# Patient Record
(demographics unavailable — no encounter records)

---

## 2022-03-04 NOTE — ED GU-FEMALE
General


Chief Complaint:  OB < 20 WEEKS


Stated Complaint:  VAG BLEEDING 6 WKS PREG


Nursing Triage Note:  


pt reports being 6-7 weeks pregnant with vaginal bleeding starting approx. 30min




pta.


Source:  patient


Exam Limitations:  no limitations





History of Present Illness


Date Seen by Provider:  Mar 4, 2022


Time Seen by Provider:  00:05


Initial Comments


Patient is a 42-year-old female G4, P3 who presents to the emergency department 

approximately 6 weeks pregnant with light vaginal bleeding/spotting.  Patient 

states symptom onset this evening.  She saw a clinic last week and had a 

transvaginal ultrasound done which reportedly showed an intrauterine gestational

sac and she states another area of "darkness" adjacent to the sac.  She has a 

follow-up appointment scheduled with Dr. Mathis on  and another 

transvaginal ultrasound scheduled for .  Patient is concerned she might 

be having a miscarriage.  She denies any significant pelvic cramping.  She has a

burning discomfort in her lower abdomen.  No burning with urination, increased 

frequency or urgency.  No abnormal vaginal discharge other than the bleeding.  

She has had no prior issues with bleeding in previous pregnancies.  No reported 

trauma.  No recent fevers or chills.  No diarrhea, black or bloody stools.





All other review of systems reviewed and negative except as stated.


Timing/Duration:  this evening


Severity/Quality:  mild


Location:  suprapubic


Radiation:  none


Activities at Onset:  none


Prior Genitourinary Problems:  none


Sexual [place] History:  less than 2 months ago, single partner


Modifying Factors:  Improves With Other ("burning pain" lower abdomen)





Allergies and Home Medications


Allergies


Coded Allergies:  


     No Known Drug Allergies (Unverified , 18)





Patient Home Medication List


Home Medication List Reviewed:  Yes


Exenatide Microspheres (Bydureon Bcise) 2 Mg/0.85 Ml Auto.injct, (Reported)


   Entered as Reported by: VALE AIKEN on 3/3/22 5060


   Last Action: New Order


Metformin HCl (Metformin HCl) 500 Mg Tablet, 1,000 PO BID, (Reported)


   Entered as Reported by: NAZANIN HARRIS on 18 1608


[id number]/Iron Fumarate/FA/Dss (Prenatal 19 Tablet) 1 Each Tablet, 1 EACH PO DAILY,

(Reported)


   Entered as Reported by: ELLA BROTHERS on 18 2300


Sertraline HCl (Sertraline HCl) 100 Mg Tablet, (Reported)


   Entered as Reported by: VALE AIKEN on 3/3/22 4553


   Last Action: New Order


Discontinued Medications


Docusate Sodium (Docusate Sodium) 100 Mg Capsule, 100 MG PO BID PRN for 

CONSTIPATION-1ST LINE


   Discontinued Reason: No Longer Taking


   Prescribed by: MIRTHA ROMERO on 18


   Last Action: Discontinued


Hydrocodone Bit/Acetaminophen (Lortab  5 Mg Tablet) 1 Tab Tab, 1-2 TAB PO Q4H 

PRN for PAIN-MODERATE


   Discontinued Reason: No Longer Taking


   Prescribed by: MIRTHA ROMERO on 18


   Last Action: Discontinued


Ibuprofen (Ibu) 600 Mg Tablet, 600 MG PO Q6H


   Discontinued Reason: No Longer Taking


   Prescribed by: MIRTHA ROMERO on 18


   Last Action: Discontinued


Nitrofurantoin Monohyd/M-Cryst (Macrobid 100 mg Capsule) 100 Mg Capsule, 1 TAB 

PO BID


   Discontinued Reason: No Longer Taking


   Prescribed by: MARIVEL STEWART on 21 0210


   Last Action: Discontinued


Ondansetron (Ondansetron Odt) 8 Mg Tab.rapdis, 8 MG PO Q4H PRN for 

NAUSEA/VOMITING


   Discontinued Reason: No Longer Taking


   Prescribed by: MARIVEL STEWART on 21 0156


   Last Action: Discontinued





Review of Systems


Review of Systems


Constitutional:  see HPI


EENTM:  no symptoms reported


Respiratory:  no symptoms reported


Cardiovascular:  no symptoms reported


Gastrointestinal:  no symptoms reported


Genitourinary:  other (vaginal bleeding)


Expected Date of Delivery:  Oct 23, 2022


LMP:  2022


Musculoskeletal:  no symptoms reported


Skin:  no symptoms reported


Psychiatric/Neurological:  No Symptoms Reported





Past Medical-Social-Family Hx


Patient Social History


Tobacco Use?:  No


Substance use?:  No


Alcohol Use?:  No


Pt feels they are or have been:  No





Immunizations Up To Date


Tetanus Booster (TDap):  Unknown





Seasonal Allergies


Seasonal Allergies:  No





Past Medical History


Surgery/Hospitalization HX:  


c-sect x3, cholecystectomy, appendectomy, lipoma removal, niddm


Surgeries:  Yes (LIPOMA FROM ABDX2,  2 )


Appendectomy,  Section


Respiratory:  No


Cardiac:  No


Neurological:  No


Expected Date of Delivery:  Oct 23, 2022


Reproductive Disorders:  Yes


Female Reproductive Disorders:  Menstrual Problems


Sexually Transmitted Disease:  No


HIV/AIDS:  No


Genitourinary:  No


Gastrointestinal:  Yes


Gastroesophageal Reflux


Musculoskeletal:  No


Endocrine:  Yes (GESTATIONAL DIABETES)


Diabetes, Non-Insulin dep


HEENT:  No


Loss of Vision:  Denies


Hearing Impairment:  Denies


Cancer:  No


Psychosocial:  Yes (HX OF ANXIETY)


Anxiety, Depression


Integumentary:  No


Blood Disorders:  No


Adverse Reaction/Blood Tranf:  No (N/A)





Physical Exam


Vital Signs





Vital Signs - First Documented








 3/3/22





 23:50


 


Temp 36.0


 


Pulse 85


 


Resp 18


 


B/P (MAP) 118/70 (86)


 


Pulse Ox 98


 


O2 Delivery Room Air





Capillary Refill : Less Than 3 Seconds


Height, Weight, BMI


Height: 5'6.00"


Weight: 228lbs. 0.0oz. 103.275136jx; 34.00 BMI


Method:Stated


General Appearance:  WD/WN, no apparent distress


Neck:  normal inspection


Cardiovascular:  regular rate, rhythm


Respiratory:  lungs clear, normal breath sounds, no respiratory distress, no 

accessory muscle use


Gastrointestinal:  normal bowel sounds, soft, tenderness (mild suprapubic tende

rness to palpation)


Extremities:  normal range of motion, non-tender, normal inspection, no pedal 

edema


Neurologic/Psychiatric:  alert, normal mood/affect, oriented x 3


Skin:  normal color, warm/dry





Progress/Results/Core Measures


Suspected Sepsis


SIRS


Temperature: 


Pulse: 85 


Respiratory Rate: 18


 


Laboratory Tests


3/4/22 00:16: White Blood Count 13.0H


Blood Pressure 118 /70 


Mean: 86


 





Laboratory Tests


3/4/22 00:16: Platelet Count 345








Results/Orders


Lab Results





Laboratory Tests








Test


 3/3/22


23:57 3/4/22


00:16 Range/Units


 


 


Urine Color YELLOW    


 


Urine Clarity CLEAR    


 


Urine pH 6.0   5-9  


 


Urine Specific Gravity 1.025 H  1.016-1.022  


 


Urine Protein NEGATIVE   NEGATIVE  


 


Urine Glucose (UA) NEGATIVE   NEGATIVE  


 


Urine Ketones TRACE H  NEGATIVE  


 


Urine Nitrite NEGATIVE   NEGATIVE  


 


Urine Bilirubin NEGATIVE   NEGATIVE  


 


Urine Urobilinogen 0.2   < = 1.0  MG/DL


 


Urine Leukocyte Esterase NEGATIVE   NEGATIVE  


 


Urine RBC (Auto) 3+ H  NEGATIVE  


 


Urine RBC 2-5 H   /HPF


 


Urine WBC NONE    /HPF


 


Urine Squamous Epithelial


Cells 2-5 


 


  /HPF





 


Urine Crystals NONE    /LPF


 


Urine Bacteria TRACE    /HPF


 


Urine Casts NONE    /LPF


 


Urine Mucus NEGATIVE    /LPF


 


Urine Culture Indicated NO    


 


White Blood Count


 


 13.0 H


 4.3-11.0


10^3/uL


 


Red Blood Count


 


 4.53 


 3.80-5.11


10^6/uL


 


Hemoglobin  12.9  11.5-16.0  g/dL


 


Hematocrit  39  35-52  %


 


Mean Corpuscular Volume  86  80-99  fL


 


Mean Corpuscular Hemoglobin  29  25-34  pg


 


Mean Corpuscular Hemoglobin


Concent 


 33 


 32-36  g/dL





 


Red Cell Distribution Width  12.3  10.0-14.5  %


 


Platelet Count


 


 345 


 130-400


10^3/uL


 


Mean Platelet Volume  10.3  9.0-12.2  fL


 


Immature Granulocyte % (Auto)  0   %


 


Neutrophils (%) (Auto)  61  42-75  %


 


Lymphocytes (%) (Auto)  31  12-44  %


 


Monocytes (%) (Auto)  7  0-12  %


 


Eosinophils (%) (Auto)  1  0-10  %


 


Basophils (%) (Auto)  1  0-10  %


 


Neutrophils # (Auto)


 


 7.9 H


 1.8-7.8


10^3/uL


 


Lymphocytes # (Auto)


 


 4.0 


 1.0-4.0


10^3/uL


 


Monocytes # (Auto)


 


 0.9 


 0.0-1.0


10^3/uL


 


Eosinophils # (Auto)


 


 0.2 


 0.0-0.3


10^3/uL


 


Basophils # (Auto)


 


 0.1 


 0.0-0.1


10^3/uL


 


Immature Granulocyte # (Auto)


 


 0.0 


 0.0-0.1


10^3/uL


 


Human Chorionic Gonadotropin,


Quant 


 1822 H


 <5  MIU/ML











My Orders





Orders - ELA ARREDONDO MD


Ed Iv/Invasive Line Start (3/4/22 00:12)


Cbc With Automated Diff (3/4/22 00:12)


Hcg,Quantitative (3/4/22 00:12)


Ua Culture If Indicated (3/4/22 00:12)


Abo Rh Type (3/4/22 00:12)





Vital Signs/I&O


Capillary Refill : Less Than 3 Seconds








Blood Pressure Mean:                    86








Progress Note :  


   Time:  01:31


Progress Note


Bedside ultrasound used to evaluate for IUP with heartbeat.  I was able to 

perform transabdominal bedside ED ultrasound.  I was able to visualize a 

gestational sac and I thought I saw a hint of a yolk sac.  I was unable to 

identify heart tones.  Patient states that she is actually bleeding heavier now 

than when she first arrived.  Her quantitative hCG is 1800.  She will follow up 

with Dr. Mathis tomorrow and has a transvaginal ultrasound scheduled for Monday. 

I reassured her.  I advised her to drink plenty of fluids, she can take Tylenol 

for cramping.  Pelvic rest recommended.  No use of tampons.  Return precautions 

discussed.  I am also sending her home with an outpatient order to recheck her 

quantitative hCG on Saturday.  All questions are sought and answered.  Patient 

is stable for discharge.





Departure


Impression





   Primary Impression:  


   Threatened miscarriage in early pregnancy


Disposition:   HOME, SELF-CARE


Condition:  Stable





Departure-Patient Inst.


Decision time for Depature:  01:32


Referrals:  


[facility] [place] (PCP/Family)


Primary Care Physician








TENA MATHIS MD


Patient Instructions:  Threatened Miscarriage (DC)





Add. Discharge Instructions:  


Drink plenty of fluids to stay well-hydrated.





Over-the-counter extra strength Tylenol, 2 tablets every 6 hours as needed for 

pain/cramping.





If you have significantly heavy bleeding, feeling lightheaded or dizzy with 

standing up, especially if you have a passing out spell, you need to come back 

to the emergency department for reevaluation.





Only use pads for vaginal bleeding.  No tampons.  Pelvic rest until you follow-

up with Dr. Mathis.





Come back to the hospital on Saturday for a redraw of your pregnancy hormone 

level.  Your level today was 1822. An order has been provided for this test.





Copy


Copies To 1:   TENA MATHIS MD, KATHRYN M MD          Mar 4, 2022 00:13

## 2022-09-29 NOTE — DIAGNOSTIC IMAGING REPORT
INDICATION: Headache, x3 weeks, right-sided.



TECHNIQUE: Multiple contiguous axial images were obtained through

the brain without the use of intravenous contrast. Auto Exposure

Controls were utilized during the CT exam to meet ALARA standards

for radiation dose reduction. 



Comparison made with 02/13/2014.



There were no extra-axial fluid collections. No intracranial

hemorrhage. No intracranial mass or mass effect. No midline

shift. The ventricles are normal in size and position. There were

no focal parenchymal abnormalities in the brain. Calvarial

windows are unremarkable.



IMPRESSION:



Negative noncontrast brain CT.



Dictated by: 



  Dictated on workstation # [id number]

## 2022-09-29 NOTE — ED HEAD INJURY
General


Chief Complaint:  Head/Cervical Problems


Stated Complaint:  HEADACHE,FEELS LIKE HEAD IS SWOLLEN





History of Present Illness


Date Seen by Provider:  Sep 29, 2022


Time Seen by Provider:  16:18


Initial Comments


Patient reports that she has had a headache every day for the past three weeks. 

Has been taking Tylenol/Ibuprofen at home for her symptoms. It helps take the 

edge off but states that the headache never completely go away. Last saw an eye 

doctor in the past 1-2 years. Denies numbness,tingling, weakness, or slurred 

speech. Reports that she has noticed occasional blurriness from right eye. 

Denies nausea, vomiting, diarrhea. Denies exposure to COVID that she is aware 

of. Initially thought that maybe her blood sugar was too high. She has not been 

checking her blood sugars at home. Was seen at [facility] this morning and has labs 

ordered tomorrow AM since she was not fasting today. They gave her a machine to 

check her blood sugars and report that she is running 140-160s today.


Occurred:  other (3 weeks)


Severity:  moderate


Location:  frontal


Associated Systoms:  No Chest Pain, No Cough, No Diaphoresis, No Fever/Chills; 

Headaches; No Malaise, No Nausea/Vomiting, No Syncope, No Weakness





Allergies and Home Medications


Allergies


Coded Allergies:  


     No Known Drug Allergies (Unverified , 18)





Patient Home Medication List


Home Medication List Reviewed:  Yes


Exenatide Microspheres (Bydureon Bcise) 2 Mg/0.85 Ml Auto.injct, (Reported)


   Entered as Reported by: VALE AIKEN on 3/3/22 2355


Metformin HCl (Metformin HCl) 500 Mg Tablet, 1,000 PO BID, (Reported)


   Entered as Reported by: NAZANIN HARRIS on 18 1608


Pph168/Iron Fumarate/FA/Dss (Prenatal 19 Tablet) 1 Each Tablet, 1 EACH PO DAILY,

(Reported)


   Entered as Reported by: ELLA BROTHERS on 18 230


Sertraline HCl (Sertraline HCl) 100 Mg Tablet, (Reported)


   Entered as Reported by: VALE AIKEN on 3/3/22 2355





Review of Systems


Review of Systems


Constitutional:  No dizziness, No fever, No weakness


Eyes:  Denies Blindness; Blurred Vision (right eye at times); Denies Drainage, 

Denies Pain, Denies Photophobia


Ears, Nose, Mouth, Throat:  no symptoms reported


Respiratory:  No cough, No short of breath


Cardiovascular:  No chest pain, No palpitations


Gastrointestinal:  No abdominal pain, No nausea, No vomiting


Musculoskeletal:  no symptoms reported


Skin:  No pruritus, No rash


Psychiatric/Neurological:  Headache; Denies Weakness





All Other Systems Reviewed


Negative Unless Noted:  Yes





Past Medical-Social-Family Hx


Immunizations Up To Date


Tetanus Booster (TDap):  Unknown





Seasonal Allergies


Seasonal Allergies:  No





Past Medical History


Surgery/Hospitalization HX:  


c-sect x3, cholecystectomy, appendectomy, lipoma removal, niddm


Surgeries:  Yes (LIPOMA FROM ABDX2,  2 )


Appendectomy,  Section


Respiratory:  No


Cardiac:  No


Neurological:  No


Reproductive Disorders:  Yes


Female Reproductive Disorders:  Menstrual Problems


Sexually Transmitted Disease:  No


HIV/AIDS:  No


Genitourinary:  No


Gastrointestinal:  Yes


Gastroesophageal Reflux


Musculoskeletal:  No


Endocrine:  Yes (GESTATIONAL DIABETES)


Diabetes, Non-Insulin dep


HEENT:  No


Loss of Vision:  Denies


Hearing Impairment:  Denies


Cancer:  No


Psychosocial:  Yes (HX OF ANXIETY)


Anxiety, Depression


Integumentary:  No


Blood Disorders:  No


Adverse Reaction/Blood Tranf:  No (N/A)





Family Medical History


Reviewed Nursing Family Hx





Physical Exam


Vital Signs





Vital Signs - First Documented








 22





 16:00


 


Temp 36.0


 


Pulse 62


 


Resp 18


 


B/P (MAP) 146/93 (110)


 


Pulse Ox 97





Capillary Refill :


Height, Weight, BMI


Height: 5'6.00"


Weight: 228lbs. 0.0oz. 103.358315ch; 34.00 BMI


Method:Stated


General Appearance:  WD/WN, no apparent distress


HEENT:  PERRL/EOMI, normal ENT inspection, TMs normal, pharynx normal


Neck:  non-tender, full range of motion, supple, normal inspection, other (no 

meningeal signs appreciated on exam)


Cardiovascular:  regular rate, rhythm


Respiratory:  lungs clear, normal breath sounds, no respiratory distress, no 

accessory muscle use


Gastrointestinal:  normal bowel sounds, non tender, soft


Psychiatric:  alert, oriented x 3





[place] Coma Score


Best Eye Response:  (4) Open Spontaneously


Best Verbal Response:  (5) Oriented


Best Motor Response:  (6) Obeys Commands





Progress/Results/Core Measures


Results/Orders


Lab Results





Laboratory Tests








Test


 22


16:45 Range/Units


 


 


White Blood Count


 12.1 H


 4.3-11.0


10^3/uL


 


Red Blood Count


 4.83 


 3.80-5.11


10^6/uL


 


Hemoglobin 13.7  11.5-16.0  g/dL


 


Hematocrit 42  35-52  %


 


Mean Corpuscular Volume 86  80-99  fL


 


Mean Corpuscular Hemoglobin 28  25-34  pg


 


Mean Corpuscular Hemoglobin


Concent 33 


 32-36  g/dL





 


Red Cell Distribution Width 12.6  10.0-14.5  %


 


Platelet Count


 382 


 130-400


10^3/uL


 


Mean Platelet Volume 10.1  9.0-12.2  fL


 


Immature Granulocyte % (Auto) 0   %


 


Neutrophils (%) (Auto) 61  42-75  %


 


Lymphocytes (%) (Auto) 30  12-44  %


 


Monocytes (%) (Auto) 7  0-12  %


 


Eosinophils (%) (Auto) 1  0-10  %


 


Basophils (%) (Auto) 0  0-10  %


 


Neutrophils # (Auto)


 7.4 


 1.8-7.8


10^3/uL


 


Lymphocytes # (Auto)


 3.6 


 1.0-4.0


10^3/uL


 


Monocytes # (Auto)


 0.9 


 0.0-1.0


10^3/uL


 


Eosinophils # (Auto)


 0.1 


 0.0-0.3


10^3/uL


 


Basophils # (Auto)


 0.1 


 0.0-0.1


10^3/uL


 


Immature Granulocyte # (Auto)


 0.0 


 0.0-0.1


10^3/uL


 


Sodium Level 138  135-145  MMOL/L


 


Potassium Level 4.1  3.6-5.0  MMOL/L


 


Chloride Level 103    MMOL/L


 


Carbon Dioxide Level 21  21-32  MMOL/L


 


Anion Gap 14  5-14  MMOL/L


 


Blood Urea Nitrogen 12  7-18  MG/DL


 


Creatinine


 0.79 


 0.60-1.30


MG/DL


 


Estimat Glomerular Filtration


Rate 95 


  





 


BUN/Creatinine Ratio 15   


 


Glucose Level 122 H   MG/DL


 


Calcium Level 9.9  8.5-10.1  MG/DL


 


Corrected Calcium 9.5  8.5-10.1  MG/DL


 


Total Bilirubin 0.4  0.1-1.0  MG/DL


 


Aspartate Amino Transf


(AST/SGOT) 32 


 5-34  U/L





 


Alanine Aminotransferase


(ALT/SGPT) 36 


 0-55  U/L





 


Alkaline Phosphatase 49    U/L


 


C-Reactive Protein High


Sensitivity 1.30 H


 0.00-0.50


MG/DL


 


Total Protein 8.1  6.4-8.2  GM/DL


 


Albumin 4.5  3.2-4.5  GM/DL








My Orders





Orders - HUNTER GEORGES


Ct Head Wo (22 16:26)


Cbc With Automated Diff (22 16:26)


Comprehensive Metabolic Panel (22 16:26)


Hs C Reactive Protein (22 16:26)


Ketorolac Injection (Toradol Injection) (22 17:45)





Medications Given in ED





Current Medications








 Medications  Dose


 Ordered  Sig/Ashley


 Route  Start Time


 Stop Time Status Last Admin


Dose Admin


 


 Ketorolac


 Tromethamine  60 mg  ONCE  ONCE


 IM  22 17:45


 22 17:46 DC 22 17:46


60 MG








Vital Signs/I&O











 22





 16:00 17:47


 


Temp 36.0 36.0


 


Pulse 62 62


 


Resp 18 18


 


B/P (MAP) 146/93 (110) 146/93


 


Pulse Ox 97 97











Progress


Progress Note :  


Progress Note


Patient presents to the ER for persistent headache for the past three weeks. Has

been taking over the counter medications at home with mild improvement of 

symptoms. No focal neuro deficits appreciated on exam. Will order labs and CT 

scan. If scans look okay will give IM Toradol and Benadryl.





1735: Reviewed labs and CT scan with patient. Of note, her WBC and CRP were 

slightly elevated. No meningeal signs. Is afebrile. CT head was negative. These 

findings were reassurance to the patient. Has labs scheduled in the morning and 

is going to have them done. Instructed that she can continue Tylenol and 

Ibuprofen for pain. May also consider taking  Benadryl every 4-6 hours as needed

for headache. I did offer to give Benadryl IM while she was here and she 

declined. She states that she would rather take that medication at home. Reasons

to return to the ER were discussed with patient.





Diagnostic Imaging





   Diagonstic Imaging:  CT


   Plain Films/CT/US/NM/MRI:  head


Comments


NAME:   CAROLE LORD


MED REC#:   [id number]


ACCOUNT#:   [id number]


PT STATUS:   REG ER


:   1979


PHYSICIAN:   HUNTER GEORGES


ADMIT DATE:   22/ER


                                  ***Signed***


Date of Exam:22





CT HEAD WO








INDICATION: Headache, x3 weeks, right-sided.





TECHNIQUE: Multiple contiguous axial images were obtained through


the brain without the use of intravenous contrast. Auto Exposure


Controls were utilized during the CT exam to meet ALARA standards


for radiation dose reduction. 





Comparison made with 2014.





There were no extra-axial fluid collections. No intracranial


hemorrhage. No intracranial mass or mass effect. No midline


shift. The ventricles are normal in size and position. There were


no focal parenchymal abnormalities in the brain. Calvarial


windows are unremarkable.





IMPRESSION:





Negative noncontrast brain CT.





Dictated by: 





  Dictated on workstation # [id number]








Dict:   22 1707


Trans:   22 1745


[facility] [id number]





Interpreted by:     KLAUDIA GARCIA MD


Electronically signed by: KLAUDIA GARCIA MD 22 1745





Departure


Impression





   Primary Impression:  


   Migraine


   Qualified Codes:  G43.919 - Migraine, unspecified, intractable, without 

   status migrainosus


Disposition:   HOME, SELF-CARE


Condition:  Stable





Departure-Patient Inst.


Decision time for Depature:  17:39


Referrals:  


[facility] [place] (PCP/Family)


Primary Care Physician


Patient Instructions:  Migraines (DC)





Add. Discharge Instructions:  


1. Home and rest.


2. Push fluids.


3. Alternate Tylenol/Ibuprofen as needed for pain.


4. Follow up with PCP as needed.


5. Consider taking Benadryl every 4-6 hours as needed for headache.


6. Alternate Tylenol/Ibuprofen as needed for pain.


7. Return here if worse or concerns.





All discharge instructions reviewed with patient and/or family. Voiced 

understanding.











HUNTER GEORGES       Sep 29, 2022 16:19

## 2023-07-26 NOTE — DIAGNOSTIC IMAGING REPORT
INDICATION: Chest wall pain, cough



The lungs clear. No failure, effusion or pneumothorax.



IMPRESSION:

No acute appearing abnormality.



Dictated by: 



  Dictated on workstation # [id number]

## 2023-07-26 NOTE — ED GENERAL
General


Chief Complaint:  Chest Wall


Stated Complaint:  LUMP ON STERNUM


Nursing Triage Note:  


c/o back pain, lump on chest x1 week. generalized maliase


Source of Information:  Patient


Exam Limitations:  No Limitations





History of Present Illness


Date Seen by Provider:  2023


Time Seen by Provider:  01:06


Initial Comments


Here with report of lump on her chest between her breasts that she is concerned 

about and states that it is causing her pain.  She states that is causing her 

anxiety to get up as well.  Worse when she is laying down and better when 

sitting up.  She also has some difficulty swallowing.  She is following her with

her doctor who has her set up for mammogram and breast ultrasound due to the 

lump.  She has had ultrasound of the thyroid as well and has a very small nodule

and will have continuing follow-up for that.  She notes that she has had some 

upper respiratory symptoms and itchy throat and mild cough recently with some 

chills and no fever.  She is just very concerned about this lump and wants to 

make sure that there is not a mass in her chest otherwise.


Timing/Duration:  1 Week


Severity:  Moderate


Associated Systoms:  Cough; No Nausea/Vomiting, No Shortness of Air, No Weakness





Allergies and Home Medications


Allergies


Coded Allergies:  


     No Known Drug Allergies (Unverified , 18)





Patient Home Medication List


Home Medication List Reviewed:  Yes


Celecoxib (Celecoxib) 200 Mg Capsule, (Reported)


   Entered as Reported by: VALE AIKEN on 23


   Last Action: New Order


Dextroamphetamine/Amphetamine (Amphetamine Salts 20 mg Tablet) 20 Mg Tablet, 

(Reported)


   Entered as Reported by: VALE AIKEN on 23


   Last Action: New Order


Dextroamphetamine/Amphetamine (Amphetamine Salts 30 mg Tablet) 30 Mg Tablet, 

(Reported)


   Entered as Reported by: VALE AIKEN on 23


   Last Action: New Order


Metformin HCl (Metformin HCl) 500 Mg Tablet, 1,000 PO BID, (Reported)


   Entered as Reported by: NAZANIN HARRIS on 18 1608


Methocarbamol (Methocarbamol) 750 Mg Tablet, (Reported)


   Entered as Reported by: VALE AIKEN on 23


   Last Action: New Order


Discontinued Medications


Exenatide Microspheres (Bydureon Bcise) 2 Mg/0.85 Ml Auto.injct, (Reported)


   Discontinued Reason: No Longer Taking


   Entered as Reported by: VALE AIKEN on 3/3/22 2355


   Last Action: Discontinued


Gxy197/Iron Fumarate/FA/Dss (Prenatal 19 Tablet) 1 Each Tablet, 1 EACH PO DAILY,

(Reported)


   Discontinued Reason: No Longer Taking


   Entered as Reported by: ELLA BROTHERS on 18 2300


   Last Action: Discontinued


Sertraline HCl (Sertraline HCl) 100 Mg Tablet, (Reported)


   Discontinued Reason: No Longer Taking


   Entered as Reported by: VALE AIKEN on 3/3/22 2355


   Last Action: Discontinued





Review of Systems


Review of Systems


Constitutional:  see HPI; No chills, No fever


EENTM:  nose congestion, throat pain


Respiratory:  cough; No short of breath


Cardiovascular:  chest pain (Anterior chest wall between breasts near lump 

noted)


Gastrointestinal:  No nausea, No vomiting


Genitourinary:  no symptoms reported


Musculoskeletal:  back pain (Chronic)


Skin:  No change in color, No lesions





Past Medical-Social-Family Hx


Patient Social History


Tobacco Use?:  No


Substance use?:  Yes


Substance type:  Marijuana


Alcohol Use?:  No


Pt feels they are or have been:  No





Immunizations Up To Date


Tetanus Booster (TDap):  Unknown


First/Initial COVID19 Vaccinat:  na





Seasonal Allergies


Seasonal Allergies:  No





Past Medical History


Surgery/Hospitalization HX:  


c-sect x3, cholecystectomy, appendectomy, lipoma removal, niddm, gerd, anxiety, 


depression


Surgeries:  Yes (LIPOMA FROM ABDX2,  2 )


Appendectomy,  Section


Respiratory:  No


Cardiac:  No


Neurological:  No


Last Menstrual Period:  2023


Reproductive Disorders:  Yes


Female Reproductive Disorders:  Menstrual Problems


Sexually Transmitted Disease:  No


HIV/AIDS:  No


Genitourinary:  No


Gastrointestinal:  Yes


Gastroesophageal Reflux


Musculoskeletal:  No


Endocrine:  Yes (GESTATIONAL DIABETES)


Diabetes, Non-Insulin dep


HEENT:  No


Loss of Vision:  Denies


Hearing Impairment:  Denies


Cancer:  No


Psychosocial:  Yes (HX OF ANXIETY)


Anxiety, Depression


Integumentary:  No


Blood Disorders:  No


Adverse Reaction/Blood Tranf:  No (N/A)





Family Medical History


Reviewed Nursing Family Hx





Physical Exam


Vital Signs





Vital Signs - First Documented








 23





 00:57


 


Temp 36.5


 


Pulse 83


 


Resp 16


 


B/P (MAP) 128/83 (98)


 


Pulse Ox 96


 


O2 Delivery Room Air





Capillary Refill : Less Than 3 Seconds


Height, Weight, BMI


Height: 5'6.00"


Weight: 228lbs. 0.0oz. [id number]; 37.00 BMI


Method:Stated


General Appearance:  WD/WN, Anxious


HEENT:  PERRL/EOMI, Pharyngeal Erythema; No Tonsillar Exudate, No Tonsillar 

Enlargement; Other (Mild nasal erythema)


Neck:  Non Tender, Supple


Respiratory:  Lungs Clear, Normal Breath Sounds


Cardiovascular:  Regular Rate, Rhythm, No Murmur


Extremity:  Normal Range of Motion


Neurologic/Psychiatric:  Alert, Oriented x3


Skin:  Warm/Dry, Other (2 x 3 cm tissue density between the breast located to 

the left side that is mobile and there is no redness or lesions near this nor 

skin changes.)





Progress/Results/Core Measures


Suspected Sepsis


SIRS


Temperature: 


Pulse: 83 


Respiratory Rate: 16


 


Blood Pressure 128 /83 


Mean: 98





Results/Orders


My Orders





Orders - ELIDA GALINDO MD


Chest Pa/Lat (2 View) (23 01:37)





Vital Signs/I&O











 23





 00:57


 


Temp 36.5


 


Pulse 83


 


Resp 16


 


B/P (MAP) 128/83 (98)


 


Pulse Ox 96


 


O2 Delivery Room Air





Capillary Refill : Less Than 3 Seconds








Blood Pressure Mean:                    98








Progress Note :  


Progress Note


Seen and evaluated.  We did discuss options for evaluation.  We will go ahead 

and get two-view chest x-ray.  This may be a lipoma but she does have breast 

ultrasound and mammogram requisitions and to be done in the near future.  We 

will see what the chest x-ray shows.  0158: Chest x-ray 2 view does not show any

infiltrate or lesions on my interpretation.  Patient is very comforted by this. 

We did discuss that she may be coming down with viral upper respiratory 

infection which may be causing some body aches and certainly she has some upper 

respiratory symptoms.  We did discuss OTC treatment for that.  She will follow-u

p with her doctor on Friday.  Discharged home with return precautions.  Patient 

verbalized understanding of instructions and agreement with plan.





Departure


Impression





   Primary Impression:  


   Skin lesion of chest wall


   Additional Impression:  


   Viral upper respiratory infection


Disposition:   HOME, SELF-CARE


Condition:  Stable





Departure-Patient Inst.


Decision time for Depature:  01:59


Referrals:  


[facility] (PCP/Family)


Primary Care Physician


Patient Instructions:  Viral Upper Respiratory Infection, Adult (DC)





Add. Discharge Instructions:  








All discharge instructions reviewed with patient and/or family. Voiced 

understanding.





You do have a lesion on your chest and you do need the mammogram and breast 

ultrasound that you are getting scheduled.  It is very likely that you have a 

viral upper respiratory infection.  For that you may continue your celecoxib and

may take acetaminophen 1000 mg every 6-8 hours.  You may also use Afrin nasal 

spray or the generic, 12-hour relief, 2 sprays to each nostril twice daily for 3

days only and then stop.  Do not use more than 3 days.  Continue to drink plenty

of fluids and try to get plenty of rest.  Follow-up with your doctor as 

scheduled on Friday.  Return for worse pain, weakness, breathing problems, 

fever, vomiting or other concerns as needed.











ELIDA GALINDO MD          2023 01:51

## 2023-09-04 NOTE — ED BACK PAIN
General


Chief Complaint:  Back Problems


Stated Complaint:  LOW BACK PAIN/LEFT LEG NUMBNESS/INCONTINENT


Nursing Triage Note:  


PT AMB TO RM 6 WITH CC OF LEFT LOWER BACK PAIN AND INCONTIENCE. PT STATES THAT 


SHE HAS HAD BACK PAIN FOR 9 YEARS AND FOR THE LAST 6 MONTHS SHE HAS HAD AN 


INCREASE IN NUMBNESS AND TINGLING. PT STATES THAT SHE LOST HER BLADDER AN HOUR 


AGO WHEN SHE WAS STANDING BY HER NIGHTSTAND.


Source of Information:  Patient


Exam Limitations:  No Limitations





History of Present Illness


Date Seen by Provider:  Sep 4, 2023


Time Seen by Provider:  18:00


Initial Comments


This 44-year-old woman presents to the emergency room with concerns about back 

and neck pain associated with weakness in the left arm and left leg as well as 

new urinary incontinence.  She has had progressive problems with pain in the 

neck and lower back.  Over the past month she has noted weakness in the left 

leg.  She occasionally has some weakness in the left arm and drops objects.  She

has a radicular-like pain that seems to be most prominent around her left elbow.

 The radicular pain in her left leg radiates from the left lateral buttock 

region down her lateral leg toward her ankle.  Today she had an episode of 

incontinence.  She did not feel any urge to urinate but suddenly felt urine 

running down her leg.  Short time later she felt an urge to urinate but was 

unable to for about 20 minutes.  When she was able to urinate, it was a trickle.

 At this time symptoms are unilateral.  She denies any dysuria or hematuria.  

She has been to a chiropractor without improvement.  She takes muscle relaxers 

which do help some.  She is being referred to a specialist at [facility] in the near 

future.  She has not had any MRI or CT studies done in the recent past.  She has

only had plain films.  She notes falling a couple of times due to her left leg 

"giving out."  She is emotionally distraught about her circumstances and is 

tearful in the exam room.  She is prescribed hydrocodone for the pain but rarely

takes it.  Dolly Peterson at the [facility] is her primary care provider. 

She was advised by her PCP to come to the emergency room if she ever developed 

incontinence issues related to her back pain.  She therefore presented to the ER

today.





Allergies and Home Medications


Allergies


Coded Allergies:  


     No Known Drug Allergies (Unverified , 18)





Patient Home Medication List


Home Medication List Reviewed:  Yes


Celecoxib (Celecoxib) 200 Mg Capsule, (Reported)


   Entered as Reported by: VALE AIKEN on 23


Cephalexin (Cephalexin) 500 Mg Tablet, 500 MG PO TID


   Prescribed by: MIGUELITO MUNOZ on 23


Dextroamphetamine/Amphetamine (Amphetamine Salts 20 mg Tablet) 20 Mg Tablet, 

(Reported)


   Entered as Reported by: VALE AIKEN on 23


Dextroamphetamine/Amphetamine (Amphetamine Salts 30 mg Tablet) 30 Mg Tablet, 

(Reported)


   Entered as Reported by: VALE AIKEN on 23


Metformin HCl (Metformin HCl) 500 Mg Tablet, 1,000 PO BID, (Reported)


   Entered as Reported by: NAZANIN HARRIS on 18


Methocarbamol (Methocarbamol) 750 Mg Tablet, (Reported)


   Entered as Reported by: VALE AIKEN on 23


Methylprednisolone (Methylprednisolone Dose Pack) 4 Mg Tab.ds.pk, 4 MG PO UD


   Prescribed by: MIGUELITO MUNOZ on 23





Review of Systems


Constitutional:  no symptoms reported


EENTM:  no symptoms reported


Respiratory:  no symptoms reported


Cardiovascular:  no symptoms reported


Gastrointestinal:  no symptoms reported


Genitourinary:  see HPI


Pregnant:  No


Musculoskeletal:  see HPI


Skin:  no symptoms reported


Psychiatric/Neurological:  See HPI





Past Medical-Social-Family Hx


Patient Social History


Tobacco Use?:  No


Substance use?:  Yes


Substance type:  Marijuana


Alcohol Use?:  No





Immunizations Up To Date


Tetanus Booster (TDap):  Unknown


First/Initial COVID19 Vaccinat:  na


Second COVID19 Vaccination Efrem:  na


Third COVID19 Vaccination Date:  na





Seasonal Allergies


Seasonal Allergies:  No





Past Medical History


Surgery/Hospitalization HX:  


c-sect x3, cholecystectomy, appendectomy, lipoma removal, niddm, gerd, anxiety, 


depression


Surgeries:  Yes (LIPOMA FROM ABDX2)


Appendectomy,  Section, Gallbladder


Respiratory:  No


Cardiac:  No


Neurological:  No


Reproductive Disorders:  Yes


Female Reproductive Disorders:  Menstrual Problems


Sexually Transmitted Disease:  No


HIV/AIDS:  No


Genitourinary:  No


Gastrointestinal:  Yes


Gastroesophageal Reflux


Musculoskeletal:  Yes


Degenerate Disk Disease, Chronic Back Pain


Endocrine:  Yes (GESTATIONAL DIABETES)


Diabetes, Non-Insulin dep


HEENT:  No


Loss of Vision:  Denies


Hearing Impairment:  Denies


Cancer:  No


Psychosocial:  Yes (HX OF ANXIETY)


Anxiety, Depression


Integumentary:  No


Blood Disorders:  No


Adverse Reaction/Blood Tranf:  No (N/A)





Physical Exam


Vital Signs





Vital Signs - First Documented








 23





 17:51


 


Temp 36.6


 


Pulse 87


 


B/P (MAP) 141/97 (112)


 


Pulse Ox 97


 


O2 Delivery Room Air





Capillary Refill :


Height, Weight, BMI


Height: 5'6.00"


Weight: 228lbs. 0.0oz. 103.579216yb; 34.00 BMI


Method:Stated


General Appearance:  No Apparent Distress, WD/WN


HEENT:  Normal ENT Inspection


Neck:  Normal Inspection


Cardiovascular:  Regular Rate, Rhythm, No Murmur


Respiratory:  Lungs Clear, Normal Breath Sounds, No Accessory Muscle Use


Gastrointestinal:  Non Tender, Soft; No Distended


Extremity:  Normal Inspection, Non Tender, No Pedal Edema


Neurologic/Psychiatric:  Alert, Oriented x3, Normal Mood/Affect, CNs II-XII Norm

as Tested, Motor Weakness (4/5 strength in the left leg)


Skin:  Normal Color, Warm/Dry





Progress/Results/Core Measures


Results/Orders


Lab Results





Laboratory Tests








Test


 23


18:04 23


18:28 Range/Units


 


 


White Blood Count


 13.0 H


 


 4.3-11.0


10^3/uL


 


Red Blood Count


 4.71 


 


 3.80-5.11


10^6/uL


 


Hemoglobin 13.5   11.5-16.0  g/dL


 


Hematocrit 41   35-52  %


 


Mean Corpuscular Volume 86   80-99  fL


 


Mean Corpuscular Hemoglobin 29   25-34  pg


 


Mean Corpuscular Hemoglobin


Concent 33 


 


 32-36  g/dL





 


Red Cell Distribution Width 12.4   10.0-14.5  %


 


Platelet Count


 387 


 


 130-400


10^3/uL


 


Mean Platelet Volume 10.8   9.0-12.2  fL


 


Immature Granulocyte % (Auto) 0    %


 


Neutrophils (%) (Auto) 58   42-75  %


 


Lymphocytes (%) (Auto) 33   12-44  %


 


Monocytes (%) (Auto) 7   0-12  %


 


Eosinophils (%) (Auto) 1   0-10  %


 


Basophils (%) (Auto) 1   0-10  %


 


Neutrophils # (Auto)


 7.6 


 


 1.8-7.8


10^3/uL


 


Lymphocytes # (Auto)


 4.3 H


 


 1.0-4.0


10^3/uL


 


Monocytes # (Auto)


 0.9 


 


 0.0-1.0


10^3/uL


 


Eosinophils # (Auto)


 0.2 


 


 0.0-0.3


10^3/uL


 


Basophils # (Auto)


 0.1 


 


 0.0-0.1


10^3/uL


 


Immature Granulocyte # (Auto)


 0.0 


 


 0.0-0.1


10^3/uL


 


Sodium Level 136   135-145  MMOL/L


 


Potassium Level 4.2   3.6-5.0  MMOL/L


 


Chloride Level 105     MMOL/L


 


Carbon Dioxide Level 21   21-32  MMOL/L


 


Anion Gap 10   5-14  MMOL/L


 


Blood Urea Nitrogen 17   7-18  MG/DL


 


Creatinine


 1.06 


 


 0.60-1.30


MG/DL


 


Estimat Glomerular Filtration


Rate 66 


 


  





 


BUN/Creatinine Ratio 16    


 


Glucose Level 143 H    MG/DL


 


Calcium Level 9.6   8.5-10.1  MG/DL


 


Magnesium Level 1.8   1.6-2.4  MG/DL


 


Serum Pregnancy Test,


Qualitative NEGATIVE 


 


 NEGATIVE  





 


Urine Color  YELLOW   


 


Urine Clarity  CLEAR   


 


Urine pH  5.5  5-9  


 


Urine Specific Gravity  >=1.030  1.016-1.022  


 


Urine Protein  NEGATIVE  NEGATIVE  


 


Urine Glucose (UA)  NEGATIVE  NEGATIVE  


 


Urine Ketones  NEGATIVE  NEGATIVE  


 


Urine Nitrite  NEGATIVE  NEGATIVE  


 


Urine Bilirubin  NEGATIVE  NEGATIVE  


 


Urine Urobilinogen  0.2  < = 1.0  MG/DL


 


Urine Leukocyte Esterase  NEGATIVE  NEGATIVE  


 


Urine RBC (Auto)  NEGATIVE  NEGATIVE  


 


Urine RBC  NONE   /HPF


 


Urine WBC  0-2   /HPF


 


Urine Squamous Epithelial


Cells 


 5-10 


  /HPF





 


Urine Crystals  NONE   /LPF


 


Urine Bacteria  MODERATE H  /HPF


 


Urine Casts  NONE   /LPF


 


Urine Mucus  NEGATIVE   /LPF


 


Urine Culture Indicated  YES   








My Orders





Orders - MIGUELITO YIN MD


Ct Head/Cervical Spine Wo (23 18:23)


Ct Thoracic/Lumbar Spine Wo (23 18:23)


Basic Metabolic Panel (23 18:23)


Cbc With Automated Diff (23 18:23)


Hcg,Qualitative Serum (23 18:23)


Magnesium (23 18:23)


Ua Culture If Indicated (23 18:24)


Bladder Scan (23 18:24)


Urine Culture (23 18:28)


Dexamethasone  Tablet (Dexamethasone  Ta (23 20:15)


Dexamethasone  Tablet (Dexamethasone  Ta (23 20:15)


Dexamethasone  Tablet (Dexamethasone  Ta (23 21:00)


Cephalexin Capsule (Cephalexin Capsule) (23 21:30)





Medications Given in ED





Current Medications








 Medications  Dose


 Ordered  Sig/Ashley


 Route  Start Time


 Stop Time Status Last Admin


Dose Admin


 


 Cephalexin HCl  500 mg  ONCE  ONCE


 PO  23 21:30


 23 21:31 DC 23 21:37


500 MG


 


 Dexamethasone  3 mg  ONCE  ONCE


 PO  23 21:00


 23 21:02 DC 23 21:06


3 MG


 


 Dexamethasone  6 mg  ONCE  ONCE


 PO  23 20:15


 23 20:16 DC 23 21:00


6 MG








Vital Signs/I&O











 23





 17:51 21:38


 


Temp 36.6 


 


Pulse 87 


 


B/P (MAP) 141/97 (112) 146/94


 


Pulse Ox 97 


 


O2 Delivery Room Air 














 23





 00:00


 


Output Total 224 ml


 


Balance -224 ml














Blood Pressure Mean:                    112











Progress


Progress Note :  


Progress Note


Patient did not require any immediate treatment for pain in the ER.  Labs were 

obtained, reviewed, and interpreted by me.  CBC revealed a leukocytosis of 13.  

CBC was otherwise unremarkable.  Chemistry studies showed mild hyperglycemia of 

143.  Chemistry was otherwise unremarkable.  Urine demonstrated moderate 

bacteria but there were also squamous cells present.  This could represent 

contamination.  Culture will be obtained.  Patient is being treated for possible

urinary tract infection in the meantime.  CT from brain through lumbar spine was

obtained.  These images were reviewed by me and there were no gross 

abnormalities appreciated on my interpretation.  Radiologist interpretation is 

noted below.  I was able to discuss this case with Dr. Landaverde, spine surgeon.  He

reviewed images himself and recommended urgent follow-up.  He will have his 

office make arrangements with the patient for MRI and appointment in the office 

for Wednesday.  In the meantime he recommends steroid therapy starting with 

dexamethasone up to 10 mg in the ER.  A 9 mg dose was administered.  A Medrol 

Dosepak was then prescribed.  Dr. Landaverde's assistance was greatly appreciated.  

See discharge instructions for further discussion.  Antibiotic therapy was 

started with Keflex for treatment of possible urinary tract infection.





Diagnostic Imaging





   Diagonstic Imaging:  CT


   Plain Films/CT/US/NM/MRI:  c-spine, head


Comments


NAME:   CAROLE LORD


MED REC#:   [id number]


ACCOUNT#:   [id number]


PT STATUS:   REG ER


:   1979


PHYSICIAN:   MIGUELITO YIN MD


ADMIT DATE:   23/ER


                                   ***Draft***


Date of Exam:23





CT HEAD/CERVICAL SPINE WO








PROCEDURE: CT head and CT cervical spine without contrast.





TECHNIQUE: Multiple contiguous axial images were obtained through


the brain and cervical spine without the use of intravenous


contrast. Sagittal and coronal reformations through the cervical


spine were then performed. Auto Exposure Controls were utilized


during the CT exam to meet ALARA standards for radiation dose


reduction. 





INDICATION: Neck pain. Left arm numbness and tingling.





COMPARISON: 2022





FINDINGS: 





CT HEAD: Ventricles and cortical sulci are normal in size and


contour. There is no midline shift or mass-effect. No acute


intra-axial hemorrhage is seen. There are no abnormal areas of


increased or decreased density to suggest acute hemorrhage or


edema. No extra-axial masses or collections are present. The bony


calvarium is intact. The visualized paranasal sinuses show


mucosal retention cyst versus polyp in the left maxillary sinus.


There is also mild scattered mucosal thickening. The mastoid air


cells are clear.





CT CERVICAL SPINE: Evaluation of static alignment shows


straightening of the normal lordotic curvature of the cervical


spine. This may be related to patient positioning, as well as


underlying spasm. There is no significant anteroretrolisthesis.


There is no evidence of jumped facets.





Vertebral body heights are maintained. There is no acute


fracture. No bony fragments are seen within the spinal canal.


Mild multilevel degenerative changes are noted. Pre and


paravertebral soft tissue structures are unremarkable. Included


portions of the lung apices are clear.





IMPRESSION:  


1. No acute intracranial abnormality. No CT evidence of mass,


acute infarct or intracranial hemorrhage.


2. No acute fracture or dislocation of the cervical spine.





  Dictated on workstation # [id number]








Dict:   23


Trans:   23


[facility] [id number]





Interpreted by:     ARY WANG MD








   Diagonstic Imaging:  CT


   Plain Films/CT/US/NM/MRI:  other (Lumbothoracic spine)


Comments


NAME:   CAROLE LORD


MED REC#:   [id number]


ACCOUNT#:   [id number]


PT STATUS:   REG ER


:   1979


PHYSICIAN:   MIGUELITO YIN MD


ADMIT DATE:   23/ER


                                   ***Draft***


Date of Exam:23





CT THORACIC/LUMBAR SPINE WO








PROCEDURE: CT thoracic and lumbar spine without contrast.





TECHNIQUE: Multiple contiguous axial images were obtained through


the thoracic and lumbar spine without the use of intravenous


contrast. Sagittal and coronal reformations were then performed.


All CT scans use one or more of the following dose optimizing


techniques: automated exposure control, MA and/or KvP adjustment


based on a patient size and exam type, or iterative


reconstruction. 





INDICATION:  Back pain. Left hip and leg pain.





COMPARISON: None





FINDINGS: 





CT THORACIC SPINE: Static alignment of the thoracic spine is


maintained. There is no significant anteroretrolisthesis. There


is no evidence of jumped facets. Vertebral body heights are


preserved. There is no acute fracture. No bony fragments are seen


within the spinal canal. Mild multilevel degenerative changes are


noted.





Pre and paravertebral soft tissue structures are unremarkable.


Included portions of the lungs are clear.





CT LUMBAR SPINE: Evaluation of static alignment shows slight


dextroscoliotic deformity of the lower lumbar spine. There is no


significant anteroretrolisthesis. There is no evidence of jumped


facets.





Vertebral body heights are maintained. There is no acute


fracture. No bony fragments are seen within the spinal canal.


Mild to moderate multilevel degenerative changes are noted


consistent with intervertebral disc height loss with anterior and


posterior disc osteophyte complex formations and multilevel facet


arthropathy. These changes appear greatest at the L4-L5 level.





Pre and paravertebral soft tissue structures are unremarkable.





IMPRESSION:


1. No acute fracture or dislocation of the thoracic or lumbar


spine.





  Dictated on workstation # [id number]








Dict:   23 1906


Trans:   23


[facility] [id number]





Interpreted by:     ARY WANG MD





Departure


Impression





   Primary Impression:  


   Cervical spinal stenosis


   Additional Impressions:  


   Neuroforaminal stenosis of lumbar spine


   Urinary incontinence


   Qualified Codes:  R32 - Unspecified urinary incontinence


   Urinary retention


Disposition:  01 HOME, SELF-CARE


Condition:  Stable





Departure-Patient Inst.


Decision time for Depature:  21:25


Referrals:  


[facility]


Primary Care Physician


Patient Instructions:  Spinal stenosis





Add. Discharge Instructions:  


There is possibility of urinary tract infection.  Continue antibiotics until 

results of your urine culture are known.  Results should be available in about 

48 hours.





You are being referred to Dr. Landaverde, a spine surgical specialist.  Expect a 

phone call from Dr. Landaverde's office tomorrow regarding scheduling an MRI and an 

appointment in his [facility].  Expect the MRI to be scheduled tomorrow if 

possible and the appointment to be Wednesday.





In the meantime, start your Medrol dose pack (steroid) tomorrow morning.





Monitor your blood sugars closely while you are on steroids and eat a diet very 

low in sugars and carbohydrates.  If you need help controlling your blood 

sugars, please contact your primary care provider.





If you have worsening symptoms despite following these instructions, please 

return to the emergency room.  Consider presenting to the emergency emergency 

room to help expedite your care as Dr. LANDAVERDE is affiliated with [facility] and they 

have more robust MRI services at [facility].





Call Dr. Landaverde's office with questions or concerns.  If you are having a 

difficulty with these arrangements and need assistance, please contact Dr. Yin at [phone].





Wear your collar as much as possible.  The firm collar will give more support.  

If the firm collar is not tolerated well, you may use a soft collar.





Also schedule an appointment with your primary care physician as soon as 

possible.











All discharge instructions reviewed with patient and/or family. Voiced 

understanding.


Scripts


Methylprednisolone (Methylprednisolone Dose Pack) 4 Mg Tab.ds.pk


4 MG PO UD for 6 Days, #21 PKG


   PER DOSE PACK INSTRUCTIONS


   Prov: MIGUELITO YIN MD         23 


Cephalexin (Cephalexin) 500 Mg Tablet


500 MG PO TID, #20 TAB


   Prov: MIGUELITO YIN MD         23


Work/School Note:  Work Release Form   Date Seen in the Emergency Department:  

Sep 4, 2023


   Return to Work:  Sep 5, 2023


      Other Restrictions Listed Below:  


No lifting over 10 pounds.


No strenuous activity or bending.


   Restrictions:  May need time off for follow-up appts several times  next few 

weeks.





Copy


Copies To 1:   [facility] [place]


Copies To 2:   SANTO LANDAVERDE MD, JOSHUA T MD         Sep 4, 2023 18:25

## 2023-09-04 NOTE — DIAGNOSTIC IMAGING REPORT
PROCEDURE: CT thoracic and lumbar spine without contrast.



TECHNIQUE: Multiple contiguous axial images were obtained through

the thoracic and lumbar spine without the use of intravenous

contrast. Sagittal and coronal reformations were then performed.

All CT scans use one or more of the following dose optimizing

techniques: automated exposure control, MA and/or KvP adjustment

based on a patient size and exam type, or iterative

reconstruction. 



INDICATION:  Back pain. Left hip and leg pain.



COMPARISON: None



FINDINGS: 



CT THORACIC SPINE: Static alignment of the thoracic spine is

maintained. There is no significant anteroretrolisthesis. There

is no evidence of jumped facets. Vertebral body heights are

preserved. There is no acute fracture. No bony fragments are seen

within the spinal canal. Mild multilevel degenerative changes are

noted.



Pre and paravertebral soft tissue structures are unremarkable.

Included portions of the lungs are clear.



CT LUMBAR SPINE: Evaluation of static alignment shows slight

dextroscoliotic deformity of the lower lumbar spine. There is no

significant anteroretrolisthesis. There is no evidence of jumped

facets.



Vertebral body heights are maintained. There is no acute

fracture. No bony fragments are seen within the spinal canal.

Mild to moderate multilevel degenerative changes are noted

consistent with intervertebral disc height loss with anterior and

posterior disc osteophyte complex formations and multilevel facet

arthropathy. These changes appear greatest at the L4-L5 level.



Pre and paravertebral soft tissue structures are unremarkable.



IMPRESSION:

1. No acute fracture or dislocation of the thoracic or lumbar

spine.



Dictated by: 



  Dictated on workstation # [id number]

## 2023-09-04 NOTE — DIAGNOSTIC IMAGING REPORT
PROCEDURE: CT head and CT cervical spine without contrast.



TECHNIQUE: Multiple contiguous axial images were obtained through

the brain and cervical spine without the use of intravenous

contrast. Sagittal and coronal reformations through the cervical

spine were then performed. Auto Exposure Controls were utilized

during the CT exam to meet ALARA standards for radiation dose

reduction. 



INDICATION: Neck pain. Left arm numbness and tingling.



COMPARISON: 09/29/2022



FINDINGS: 



CT HEAD: Ventricles and cortical sulci are normal in size and

contour. There is no midline shift or mass-effect. No acute

intra-axial hemorrhage is seen. There are no abnormal areas of

increased or decreased density to suggest acute hemorrhage or

edema. No extra-axial masses or collections are present. The bony

calvarium is intact. The visualized paranasal sinuses show

mucosal retention cyst versus polyp in the left maxillary sinus.

There is also mild scattered mucosal thickening. The mastoid air

cells are clear.



CT CERVICAL SPINE: Evaluation of static alignment shows

straightening of the normal lordotic curvature of the cervical

spine. This may be related to patient positioning, as well as

underlying spasm. There is no significant anteroretrolisthesis.

There is no evidence of jumped facets.



Vertebral body heights are maintained. There is no acute

fracture. No bony fragments are seen within the spinal canal.

Mild multilevel degenerative changes are noted. Pre and

paravertebral soft tissue structures are unremarkable. Included

portions of the lung apices are clear.



IMPRESSION:  

1. No acute intracranial abnormality. No CT evidence of mass,

acute infarct or intracranial hemorrhage.

2. No acute fracture or dislocation of the cervical spine.



Dictated by: 



  Dictated on workstation # [id number]

## 2023-09-21 NOTE — ED CHEST PAIN
General


Chief Complaint:  Chest Pain


Stated Complaint:  NECK AND BACK PAIN THAT RADIATES TO CHEST


Nursing Triage Note:  


PT AMB TO [facility] 10 WITH CC OF PAIN THE UPPER BACK BETWEEN SHOULDER BLADES THAT 


RADIATES TO THE RIGHT SHOULDER. PT REPORTS THAT SHE STARTED GABAPENTIN ON FRIDAY




AND WOKE SATURDAY WITH PAIN.


Source:  patient


Exam Limitations:  no limitations





History of Present Illness


Date Seen by Provider:  Sep 21, 2023


Time Seen by Provider:  17:03


Initial Comments


44-year-old female presents to the ER with complaint of pain in her mid upper 

back, states that it feels "like a bruise."  She states the pain then wraps 

around her right upper back under her armpit to right chest and ends at her ster

num.  She states that the pain that radiates around her back to her chest feels 

like a sunburn.  She states that the pain started on Monday, . She denies 

shortness of air with the pain.





Allergies and Home Medications


Allergies


Coded Allergies:  


     No Known Drug Allergies (Unverified , 18)





Patient Home Medication List


Home Medication List Reviewed:  Yes


Celecoxib (Celecoxib) 200 Mg Capsule, (Reported)


   Entered as Reported by: VALE AIKEN on 23


Cephalexin (Cephalexin) 500 Mg Tablet, 500 MG PO TID


   Prescribed by: MIGUELITO MUNOZ on 23


Dextroamphetamine/Amphetamine (Amphetamine Salts 20 mg Tablet) 20 Mg Tablet, 

(Reported)


   Entered as Reported by: VALE AIKEN on 23


Dextroamphetamine/Amphetamine (Amphetamine Salts 30 mg Tablet) 30 Mg Tablet, 

(Reported)


   Entered as Reported by: VALE AIKEN on 23


Metformin HCl (Metformin HCl) 500 Mg Tablet, 1,000 PO BID, (Reported)


   Entered as Reported by: NAZANIN HARRIS on 18 1608


Methocarbamol (Methocarbamol) 750 Mg Tablet, (Reported)


   Entered as Reported by: VALE AIKEN on 23


Methylprednisolone (Methylprednisolone Dose Pack) 4 Mg Tab.ds.pk, 4 MG PO UD


   Prescribed by: MIGUELITO MUNOZ on 23


Valacyclovir HCl (Valacyclovir) 1,000 Mg Tablet, 1,000 MG PO Q8H


   Prescribed by: Noa Duran on 23 184





Review of Systems


Review of Systems


Constitutional:  see HPI





Past Medical-Social-Family Hx


Patient Social History


Tobacco Use?:  No


Substance use?:  No


Alcohol Use?:  No





Immunizations Up To Date


Tetanus Booster (TDap):  Unknown


First/Initial COVID19 Vaccinat:  na


Second COVID19 Vaccination Efrem:  na


Third COVID19 Vaccination Date:  na





Seasonal Allergies


Seasonal Allergies:  No





Past Medical History


Surgery/Hospitalization HX:  


c-sect x3, cholecystectomy, appendectomy, lipoma removal, niddm, gerd, anxiety, 


depression


Surgeries:  Yes (LIPOMA FROM ABDX2)


Appendectomy,  Section, Gallbladder


Respiratory:  No


Cardiac:  No


Neurological:  No


Reproductive Disorders:  Yes


Female Reproductive Disorders:  Menstrual Problems


Sexually Transmitted Disease:  No


HIV/AIDS:  No


Genitourinary:  No


Gastrointestinal:  Yes


Gastroesophageal Reflux


Musculoskeletal:  Yes


Degenerate Disk Disease, Chronic Back Pain


Endocrine:  Yes (GESTATIONAL DIABETES)


Diabetes, Non-Insulin dep


HEENT:  No


Loss of Vision:  Denies


Hearing Impairment:  Denies


Cancer:  No


Psychosocial:  Yes (HX OF ANXIETY)


Anxiety, Depression


Integumentary:  No


Blood Disorders:  No


Adverse Reaction/Blood Tranf:  No (N/A)





Physical Exam


Vital Signs





Vital Signs - First Documented








 23





 16:39


 


Pulse 94


 


B/P (MAP) 124/94 (104)


 


Pulse Ox 98


 


O2 Delivery Room Air





Capillary Refill :


Height, Weight, BMI


Height: 5'6.00"


Weight: 228lbs. 0.0oz. 103.123027mm; 35.00 BMI


Method:Stated


General Appearance:  No Apparent Distress, WD/WN


Neck:  Normal Inspection, Supple


Respiratory:  Lungs Clear, Normal Breath Sounds, No Accessory Muscle Use, No 

Respiratory Distress


Cardiovascular:  Regular Rate, Rhythm


Extremity:  Normal Inspection, Normal Range of Motion


Neurologic/Psychiatric:  Alert, Normal Mood/Affect


Skin:  Normal Color, Warm/Dry, Other (Tenderness to palpation where patient's 

pain is located)





Progress/Results/Core Measures


Results/Orders


Lab Results





Laboratory Tests








Test


 23


16:55 Range/Units


 


 


White Blood Count


 13.7 H


 4.3-11.0


10^3/uL


 


Red Blood Count


 4.43 


 3.80-5.11


10^6/uL


 


Hemoglobin 12.5  11.5-16.0  g/dL


 


Hematocrit 39  35-52  %


 


Mean Corpuscular Volume 87  80-99  fL


 


Mean Corpuscular Hemoglobin 28  25-34  pg


 


Mean Corpuscular Hemoglobin


Concent 33 


 32-36  g/dL





 


Red Cell Distribution Width 12.6  10.0-14.5  %


 


Platelet Count


 369 


 130-400


10^3/uL


 


Mean Platelet Volume 10.6  9.0-12.2  fL


 


Immature Granulocyte % (Auto) 0   %


 


Neutrophils (%) (Auto) 61  42-75  %


 


Lymphocytes (%) (Auto) 30  12-44  %


 


Monocytes (%) (Auto) 8  0-12  %


 


Eosinophils (%) (Auto) 1  0-10  %


 


Basophils (%) (Auto) 0  0-10  %


 


Neutrophils # (Auto)


 8.4 H


 1.8-7.8


10^3/uL


 


Lymphocytes # (Auto)


 4.1 H


 1.0-4.0


10^3/uL


 


Monocytes # (Auto)


 1.0 


 0.0-1.0


10^3/uL


 


Eosinophils # (Auto)


 0.1 


 0.0-0.3


10^3/uL


 


Basophils # (Auto)


 0.1 


 0.0-0.1


10^3/uL


 


Immature Granulocyte # (Auto)


 0.0 


 0.0-0.1


10^3/uL


 


Prothrombin Time 12.3  12.2-14.7  SEC


 


INR Comment 0.9  0.8-1.4  


 


Activated Partial


Thromboplast Time 35 


 24-35  SEC





 


Sodium Level 138  135-145  MMOL/L


 


Potassium Level 4.0  3.6-5.0  MMOL/L


 


Chloride Level 104    MMOL/L


 


Carbon Dioxide Level 24  21-32  MMOL/L


 


Anion Gap 10  5-14  MMOL/L


 


Blood Urea Nitrogen 14  7-18  MG/DL


 


Creatinine


 0.75 


 0.60-1.30


MG/DL


 


Estimat Glomerular Filtration


Rate 101 


  





 


BUN/Creatinine Ratio 19   


 


Glucose Level 110 H   MG/DL


 


Calcium Level 9.6  8.5-10.1  MG/DL


 


Corrected Calcium 9.4  8.5-10.1  MG/DL


 


Magnesium Level 1.9  1.6-2.4  MG/DL


 


Total Bilirubin 0.3  0.1-1.0  MG/DL


 


Aspartate Amino Transf


(AST/SGOT) 20 


 5-34  U/L





 


Alanine Aminotransferase


(ALT/SGPT) 29 


 0-55  U/L





 


Alkaline Phosphatase 46    U/L


 


Troponin I < 0.028  <0.028  NG/ML


 


Total Protein 7.3  6.4-8.2  GM/DL


 


Albumin 4.2  3.2-4.5  GM/DL








My Orders





Orders - NOA DURANT


Cbc And Automated Diff (23 17:16)


Magnesium (23 17:16)


Chest 1 View, Ap/Pa Only (23 17:16)


Comprehensive Metabolic Panel (23 17:16)


Protime With Inr (23 17:16)


Partial Thromboplastin Time (23 17:16)


Monitor-Rhythm Ecg Trace Only (23 17:16)


Ed Iv/Invasive Line Start (23 17:16)


Troponin I [place] (23 17:16)





Vital Signs/I&O











 23





 16:39 19:02


 


Pulse 94 87


 


B/P (MAP) 124/94 (104) 124/99


 


Pulse Ox 98 97


 


O2 Delivery Room Air Room Air














Blood Pressure Mean:                    104











Progress


Progress Note :  


Progress Note


Patient seen and evaluated, resting comfortably in bed, no acute distress.  

Based on exam and symptoms, cardiac work-up initiated including CBC, CMP, coags,

magnesium, troponin, EKG, chest x-ray.





1840 Labs and chest x-ray reviewed. CBC shows slightly elevated WBC 13.7.  CMP 

grossly normal.  Troponin negative.  Magnesium normal.  Coags normal.  Chest x-

ray shows no acute cardiopulmonary process.  I do not think that her pain is 

cardiac related.  Based on symptoms, I believe that this could be shingles.  No 

rash noted, but pain stays along the dermatome, states only on the right side of

the body, patient reports pain when her clothes touch her skin, she reports that

the pain feels like a sunburn.  Will treat with valacyclovir.  Results discussed

with patient.  Discharge directions and return precautions provided.


Initial ECG Impression Date:  Sep 21, 2023


Initial ECG Impression Time:  16:44


Initial ECG Rate:  90


Initial ECG Rhythm:  Normal Sinus


Initial ECG Intervals:  Normal


Initial ECG Impression:  Normal


Initial ECG Comparisson:  Unchanged





Diagnostic Imaging





   Diagonstic Imaging:  Xray


   Plain Films/CT/US/NM/MRI:  chest


Comments


                 [facility] [place] [place]





NAME:   CAROLE LORD


MED REC#:   [id number]


ACCOUNT#:   [id number]


PT STATUS:   REG ER


:   1979


PHYSICIAN:   NOA DURANT


ADMIT DATE:   23/ER


                                  ***Signed***


Date of Exam:23





CHEST 1 VIEW, AP/PA ONLY








EXAMINATION: Chest, one view.





HISTORY: Chest pain.





COMPARISON: 2023.





FINDINGS: 





The lung volumes are normal. No focal consolidation is seen. No


large pleural effusion or pneumothorax is seen. The


cardiomediastinal silhouette is normal in size and contour. No


acute osseous abnormality is seen.





IMPRESSION: 





1. No acute pleural-parenchymal process.





Dictated by: 





  Dictated on workstation # [id number]








Dict:   23


Trans:   23


 [id number]





Interpreted by:     GRACE ROMERO DO


Electronically signed by: GRACE ROMERO DO 23





Departure


Impression





   Primary Impression:  


   Shingles


Disposition:  01 HOME, SELF-CARE


Condition:  Stable





Departure-Patient Inst.


Decision time for Depature:  18:42


Referrals:  


[facility] (PCP/Family)


Primary Care Physician


Patient Instructions:  Shingles (DONNIE)





Add. Discharge Instructions:  


Complete full course of the antiviral.  You will take 1 tablet every 8 hours for

7 days.


Follow-up with your primary care provider if symptoms continue after you 

complete the antiviral.


Return for any new, concerning, or worsening symptoms.





All discharge instructions reviewed with patient and/or family. Voiced 

understanding.


Scripts


Valacyclovir HCl (Valacyclovir) 1,000 Mg Tablet


1000 MG PO Q8H for 7 Days, #21 TAB 0 Refills


   Prov: NOA DURANT         23











NOA DURANT          Sep 21, 2023 17:23

## 2023-09-21 NOTE — DIAGNOSTIC IMAGING REPORT
EXAMINATION: Chest, one view.



HISTORY: Chest pain.



COMPARISON: 07/26/2023.



FINDINGS: 



The lung volumes are normal. No focal consolidation is seen. No

large pleural effusion or pneumothorax is seen. The

cardiomediastinal silhouette is normal in size and contour. No

acute osseous abnormality is seen.



IMPRESSION: 



1. No acute pleural-parenchymal process.



Dictated by: 



  Dictated on workstation # [id number]